# Patient Record
Sex: FEMALE | Race: WHITE | NOT HISPANIC OR LATINO | URBAN - METROPOLITAN AREA
[De-identification: names, ages, dates, MRNs, and addresses within clinical notes are randomized per-mention and may not be internally consistent; named-entity substitution may affect disease eponyms.]

---

## 2018-07-10 NOTE — PATIENT DISCUSSION
No evidence of retained tubing OS. Suspect chronic rhinitis. Hx of chronic pulmonary disease and chonic congestion left nasal passage. Hx of DCR approximately 6 years ago. Recommend evaluation with ENT specialist.  Will facilitate this for the patient.

## 2018-07-10 NOTE — PATIENT DISCUSSION
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 A intranasal approach for a dacryocystorhinostomy was planned. The purpose of the nasal endoscopy was to evaluate a surgical site for abnormalities which could alter the surgical course including turbinate hypertrophy or septal deviation. A Stortz 0 degree endoscope was placed intranasally on both sides and the following observations were noted: significant congestion left nasal mucosa w/ obstructed nasal passage, no evidence of retained tubing.

## 2021-04-16 ENCOUNTER — IMPORTED ENCOUNTER (OUTPATIENT)
Dept: URBAN - METROPOLITAN AREA CLINIC 9 | Facility: CLINIC | Age: 71
End: 2021-04-16

## 2021-04-16 PROBLEM — Z98.890: Noted: 2021-10-11

## 2021-04-16 PROBLEM — INACTIVE: Noted: 2021-04-16

## 2021-04-16 PROBLEM — H04.123: Noted: 2021-04-16

## 2021-10-16 ASSESSMENT — VISUAL ACUITY
OD_SC: 20/25 SN
OS_SC: 20/25 SN

## 2021-10-20 ENCOUNTER — POST-OP (OUTPATIENT)
Dept: URBAN - METROPOLITAN AREA CLINIC 14 | Facility: CLINIC | Age: 71
End: 2021-10-20

## 2021-10-20 DIAGNOSIS — Z98.890: ICD-10-CM

## 2021-10-20 PROCEDURE — 99024 POSTOP FOLLOW-UP VISIT: CPT

## 2021-10-20 ASSESSMENT — VISUAL ACUITY
OD_SC: 20/25
OS_SC: 20/25

## 2021-11-17 ENCOUNTER — POST-OP (OUTPATIENT)
Dept: URBAN - METROPOLITAN AREA CLINIC 14 | Facility: CLINIC | Age: 71
End: 2021-11-17

## 2021-11-17 DIAGNOSIS — D23.122: ICD-10-CM

## 2021-11-17 DIAGNOSIS — Z98.890: ICD-10-CM

## 2021-11-17 PROCEDURE — 99024 POSTOP FOLLOW-UP VISIT: CPT

## 2021-11-17 ASSESSMENT — VISUAL ACUITY
OD_SC: 20/25
OS_SC: 20/25

## 2021-12-16 ENCOUNTER — POST-OP (OUTPATIENT)
Dept: URBAN - METROPOLITAN AREA CLINIC 15 | Facility: CLINIC | Age: 71
End: 2021-12-16

## 2021-12-16 DIAGNOSIS — L98.8: ICD-10-CM

## 2021-12-16 PROCEDURE — 96999PV

## 2022-02-10 ENCOUNTER — POST-OP (OUTPATIENT)
Dept: URBAN - NONMETROPOLITAN AREA CLINIC 5 | Facility: CLINIC | Age: 72
End: 2022-02-10

## 2022-02-10 DIAGNOSIS — L98.8: ICD-10-CM

## 2022-02-10 DIAGNOSIS — Z98.890: ICD-10-CM

## 2022-02-10 PROCEDURE — 99024 POSTOP FOLLOW-UP VISIT: CPT

## 2022-02-10 ASSESSMENT — VISUAL ACUITY
OS_SC: 20/25
OD_SC: 20/25

## 2022-03-14 ENCOUNTER — POST-OP (OUTPATIENT)
Dept: URBAN - METROPOLITAN AREA CLINIC 15 | Facility: CLINIC | Age: 72
End: 2022-03-14

## 2022-03-14 DIAGNOSIS — L98.8: ICD-10-CM

## 2022-03-14 PROCEDURE — 96999PV

## 2022-03-14 ASSESSMENT — VISUAL ACUITY
OD_SC: 20/25
OS_SC: 20/25